# Patient Record
Sex: MALE | Race: WHITE | NOT HISPANIC OR LATINO | URBAN - METROPOLITAN AREA
[De-identification: names, ages, dates, MRNs, and addresses within clinical notes are randomized per-mention and may not be internally consistent; named-entity substitution may affect disease eponyms.]

---
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
## 2021-05-28 NOTE — TELEPHONE ENCOUNTER
Name of form/paperwork: Other:  college vaccine form   Have you been seen for this request: 1/11/19  Do we have the form: yes   When is form needed by: ASAP   How would you like the form returned:  - please call [phone]  Fax Number: NA   Patient Notified form requests are processed in 3-5 business days: Yes  (If patient needs form sooner, please note that in this message.)  Okay to leave a detailed message? Yes    Form prepped and placed on PCP's desk for review and signature.      ISELA Nguyen

## 2021-05-31 VITALS — BODY MASS INDEX: 24.58 KG/M2 | WEIGHT: 175.6 LBS | HEIGHT: 71 IN

## 2021-05-31 VITALS — WEIGHT: 167.8 LBS

## 2021-06-01 VITALS — WEIGHT: 164.1 LBS | BODY MASS INDEX: 22.97 KG/M2 | HEIGHT: 71 IN

## 2021-06-02 VITALS — BODY MASS INDEX: 21.71 KG/M2 | HEIGHT: 72 IN | WEIGHT: 160.3 LBS

## 2021-06-12 NOTE — PROGRESS NOTES
ASSESSMENT:  1. Cough  2 view chest x-rays obtained, which shows no acute infiltrate, pneumothorax, or pulmonary effusion.  NP swab is sent for pertussis.  I recommended starting azithromycin, as below.  We discussed airway reactivity and potential mycoplasma infection.  I also recommended starting albuterol 2 puffs every 4 hours while awake, as below, based on history.  If the inhaler is beneficial, continuing to the cough is gone, and then wean off, otherwise discontinue after several days.  Return for further evaluation if there is no significant improvement over the next several days to a week, sooner with new or worsening symptoms.  Encouraged grandmother to call with concerns or questions.    - XR Chest PA and Lateral; Future  - Bordetella pertussis PCR  - azithromycin (ZITHROMAX) 250 MG tablet; Take 500 mg (2 x 250 mg tablets) on day 1 followed by 250 mg (1 tablet) on days 2-5.  Dispense: 6 tablet; Refill: 0  - albuterol (PROAIR HFA;PROVENTIL HFA;VENTOLIN HFA) 90 mcg/actuation inhaler; Inhale 2 puffs every 4 (four) hours as needed (coughing).  Dispense: 18 g; Refill: 0      PLAN:  Patient Instructions   Start inhaler: 2 puffs every 4 hours while awake. If this does not help, you may discontinue medication after 2-3 days.    Start azithromycin as prescribed, 2 tablets together today and 1 tablet daily for the next 4 days. Take this medication around the same time each day.       Orders Placed This Encounter   Procedures     Bordetella pertussis PCR     Order Specific Question:   Nasopharyngeal Source:     Answer:   Nasopharyngeal swab     XR Chest PA and Lateral     Standing Status:   Future     Number of Occurrences:   1     Standing Expiration Date:   9/6/2018     Order Specific Question:   Can the procedure be changed per Radiologist protocol?     Answer:   Yes     Medications Discontinued During This Encounter   Medication Reason     atovaquone-proguanil (MALARONE) 250-100 mg Tab        No Follow-up on  file.    CHIEF COMPLAINT:  Chief Complaint   Patient presents with     Cough     x1 month very congested hard to play sports worse with exercise        HISTORY OF PRESENT ILLNESS:  Scot is a 17 y.o. male presenting to the clinic today with grandma, Clair, with concerns for cough. Symptoms started 1 month ago while he was at a cabin in [place], [place], with friends. He used to cough every few minutes and the cough is now improved except when he is playing sports. His cough is described as productive and the mucus often gets caught in his throat when he is running around. The mucus is green. He has had some difficulty breathing after coughing stretches at the initial onset of cough, this is improved now. He denies post-tussive emesis and wheezing.       REVIEW OF SYSTEMS:   He had one headache in his forehead region a couple days ago. He otherwise denies pressure in sinuses. He had brief diarrhea after travel to [facility]. All other systems are negative.    PFSH:  He just returned from [place], [place]. He is playing both basketball and lacrosse. As reviewed above.     TOBACCO USE:  History   Smoking Status     Never Smoker   Smokeless Tobacco     Never Used     Comment: No exposure to secondhand smoke.       VITALS:  Vitals:    09/06/17 1400   Pulse: 53   Temp: 97.3  F (36.3  C)   TempSrc: Oral   SpO2: 97%   Weight: 167 lb 12.8 oz (76.1 kg)     Wt Readings from Last 3 Encounters:   09/06/17 167 lb 12.8 oz (76.1 kg) (84 %, Z= 1.00)*   12/02/16 152 lb 12.8 oz (69.3 kg) (77 %, Z= 0.74)*   01/05/16 150 lb 9.6 oz (68.3 kg) (84 %, Z= 1.00)*     * Growth percentiles are based on CDC 2-20 Years data.     There is no height or weight on file to calculate BMI.    PHYSICAL EXAM:  Alert, no acute distress.   HEENT, Conjunctivae are clear, TMs are without erythema, pus or fluid. Position and landmarks are normal. No maxillary or frontal sinus tenderness. Nose is clear. Oropharynx is slightly erythematous, without tonsillar  hypertrophy, asymmetry, exudate or lesions.  Neck is supple without adenopathy.  Lungs are clear and have good air entry bilaterally, without wheezes or crackles.  No prolongation of expiratory phase.   No tachypnea, retractions, or increased work of breathing.  Cardiac exam regular rate and rhythm, normal S1 and S2.  Abdomen is soft and nontender, bowel sounds are present, no hepatosplenomegaly.  Skin, clear without rash.  Neuro, moving all extremities equally.      No results found for this or any previous visit (from the past 24 hour(s)).    ADDITIONAL HISTORY SUMMARIZED (2): Reviewed Office Visit from 12/2/2016 regarding travel consult.  DECISION TO OBTAIN EXTRA INFORMATION (1): None.   RADIOLOGY TESTS (1): Chest XR ordered.  LABS (1): Labs ordered.  MEDICINE TESTS (1): None.  INDEPENDENT REVIEW (2 each): Chest XR interpreted as above.     The visit lasted a total of 23 minutes face to face with the patient. Over 50% of the time was spent counseling and educating the patient about cough.    I, Amanda Keating, am scribing for and in the presence of, Dr. Frey.    I, Lenin Frey, personally performed the services described in this documentation, as scribed by Amanda Keating in my presence, and it is both accurate and complete.    MEDICATIONS:  Current Outpatient Prescriptions   Medication Sig Dispense Refill     albuterol (PROAIR HFA;PROVENTIL HFA;VENTOLIN HFA) 90 mcg/actuation inhaler Inhale 2 puffs every 4 (four) hours as needed (coughing). 18 g 0     azithromycin (ZITHROMAX) 250 MG tablet Take 500 mg (2 x 250 mg tablets) on day 1 followed by 250 mg (1 tablet) on days 2-5. 6 tablet 0     No current facility-administered medications for this visit.        Total data points: 6

## 2021-06-14 NOTE — PROGRESS NOTES
[facility] Well Child Check    ASSESSMENT & PLAN  Scot Harden is a 17  y.o. 11  m.o. who has normal growth and normal development.    Diagnoses and all orders for this visit:    Encounter for routine child health examination without abnormal findings  -     Meningococcal MCV4P  -     Influenza, Seasonal,Quad Inj, 36+ MOS (multi-dose vial)  -     Hearing Screening  -     Vision Screening  -     PHQ9 Depression Screen    Sports history form was reviewed, and sports clearance form was provided.    Return to clinic in 1 year for a Well Child Check or sooner as needed    IMMUNIZATIONS/LABS  Immunizations were reviewed and orders were placed as appropriate. and I have discussed the risks and benefits of all of the vaccine components with the patient/parents.  All questions have been answered.    REFERRALS  Dental:  Recommend routine dental care as appropriate., The patient has already established care with a dentist.  Other:  No referrals were made at this time.    ANTICIPATORY GUIDANCE  I have reviewed age appropriate anticipatory guidance.  Social:  Friends, Extracurricular Activities and Changes and Choices  Parenting:  [place]/Dependence, Homework and Confidential Health Care  Nutrition:  Age Specific Nutritional Needs  Play and Communication:  Organized Sports, Hobbies and Read Books  Health:  Self Testicular Exam, Activity (>45 min/day), Sleep and Dental Care  Safety:  Seat Belts and Bike/Motorcycle Helmets  Sexuality:  Safe Sex and STD's    HEALTH HISTORY  Do you have any concerns that you'd like to discuss today?: No concerns     Roomed by: Collette INGRAM CMA    Accompanied by Mother      Do you have any significant health concerns in your family history?: Yes: see below.  Family History   Problem Relation Age of Onset     Celiac disease Mother      Diabetes type II Maternal Grandfather      Diabetes type II Paternal Grandfather      Crohn's disease Maternal Uncle      No Medical Problems Father      No Medical  Problems Brother      No Medical Problems Brother      No Medical Problems Brother      Since your last visit, have there been any major changes in your family, such as a move, job change, separation, divorce, or death in the family?: No  Has a lack of transportation kept you from medical appointments?: No    Home  Who lives in your home?:  See narrative below.   Social History     Social History Narrative    Lives with mom, Ami, dad, and 3 brothers, Neftaly, Mathieu, and Raffi        Mom and Dad are      Do you have any concerns about losing your housing?: No  Is your housing safe and comfortable?: Yes    Do you have any trouble with sleep?:  No, he falls asleep quickly in the evening. He sleeps soundly through the night without waking. He gets sufficient restful sleep each night. He has a good energy level.    Education  What school do you child attend?:  [employer]  What grade are you in?:  11th  How do you perform in school (grades, behavior, attention, homework?: pretty good 3.5 gpa. He is in 11th grade this year. He enjoys school and learning. He focuses well in class, completes his work on time, and earns good grades. He [place] He would like to study civil engineering. He would also like to play collegiate lacrosse.    Eating He has a good appetite. He does not skip meals throughout the day. He eats a healthy, balanced diet with a variety of fruits, vegetables, and proteins. He tries to make healthy food choices. He drinks 2% milk and water daily.  Do you eat regular meals including fruits and vegetables?:  yes  What are you drinking (cow's milk, water, soda, juice, sports drinks, energy drinks, etc)?: cow's milk- 2% and water  Have you been worried that you don't have enough food?: No  Do you have concerns about your body or appearance?:  No    Activities  Do you have friends?:  Yes, he has good friends with whom he gets along  well and enjoys spending time.  Do you get at least one hour of physical activity per day?:  yes  How many hours a day are you in front of a screen other than for schoolwork (computer, TV, phone)?:  3  What do you do for exercise?:  Lacrosse, basketball, working out, and  snowboarding   Do you have interest/participate in community activities/volunteers/school sports?:  yes, lacrosse, volunteer at a [employer], and basketball      MENTAL HEALTH SCREENING  PHQ-2 Total Score: 0 (12/22/2017 12:00 PM)  PHQ-9 Total Score: 0 (12/22/2017 12:00 PM)    VISION/HEARING  Vision: Completed. See Results  Hearing:  Completed. See Results     Hearing Screening    125Hz 250Hz 500Hz 1000Hz 2000Hz 3000Hz 4000Hz 6000Hz 8000Hz   Right ear:   25 20 20  20 20    Left ear:   30 20 20  20 20       Visual Acuity Screening    Right eye Left eye Both eyes   Without correction: 20/20 20/20 20/20   With correction:        TB Risk Assessment:  The patient and/or parent/guardian answer positive to:  self or family member has traveled outside of the [place] in the past 12 months  self or family memeber has been homeless, living in a homeless shelter or been in group home  dad is a      Dyslipidemia Risk Screening  Have either of your parents or any of your grandparents had a stroke or heart attack before age 55?: No  Any parents with high cholesterol or currently taking medications to treat?: No     Dental  When was the last time you saw the dentist?: 3-6 months ago   Is child seen by dentist?     Yes    Patient Active Problem List   Diagnosis     Cough     Drugs  Does the patient use tobacco/alcohol/drugs?:  no    Safety  Does the patient have any safety concerns (peer or home)?:  no  Does the patient use safety belts, helmets and other safety equipment?:  yes    Sex  Have you ever had sex?:  Yes, he and his partner always use condoms for protection. He reports one incidence of condom slippage and/or breakage. He denies any  "STI symptoms including penile discharge, burning with urination, dysuria, or testicular pain. He is confident his partner has not had any previous sexual partners. He performs regular testicular self-exams.    REVIEW OF SYSTEMS  History obtained from mother and child.  General: Negative  Shoulder Injury: He sustained a left shoulder injury a few months ago which was revealed to be an impingement. He was seen by an orthopedist who referred him to physical therapy. He worked on proper weightlifting techniques. His shoulder has nearly fully recovered. However he continues to experience mild pain if he does not perform a weightlifting routine with proper technique. He has injured his shoulder a few times in the past.  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  Respiration: He had a cough three months ago which was causing wheezing. He had difficulty during physical activity. He used albuterol prior to exercise which helped improve his breathing. He has not needed to use the albuterol inhaler lately.  Dental: He brushes his teeth daily. He does not have dental caries.  His parents have no other health or developmental concerns.    MEASUREMENTS  Height:  5' 11.25\" (1.81 m)  Weight: 175 lb 9.6 oz (79.7 kg)  BMI: Body mass index is 24.32 kg/(m^2).  Blood Pressure: 118/62  Blood pressure percentiles are 40 % systolic and 28 % diastolic based on NHBPEP's 4th Report. Blood pressure percentile targets: 90: 134/84, 95: 138/88, 99 + 5 mmH/101.    PHYSICAL EXAM  Constitutional: He appears well-developed and well-nourished.   HEENT: Head: Normocephalic.    Right Ear: Tympanic membrane, external ear and canal normal.    Left Ear: Tympanic membrane, external ear and canal normal.    Nose: Nose normal.    Mouth/Throat: Mucous membranes are moist. Oropharynx is clear.    Eyes: Conjunctivae and lids are normal. Pupils are equal, round, and reactive to light. Optic discs are sharp. "   Neck: Neck supple. No thyromegaly or adenopathy is present.   Cardiovascular: Normal rate and regular rhythm. No murmur heard.  Pulmonary/Chest: Effort normal and breath sounds normal. There is normal air entry.   Abdominal: Soft. There is no hepatosplenomegaly. No inguinal hernia.   Genitourinary: Testes normal and penis normal. SMR 5.   Musculoskeletal: Normal range of motion. Normal strength and tone. No abnormalities. Spine is straight. Pre-participation screening exam normal with exception of mildly tight hamstrings bilaterally.  Neurological: He is alert. He has normal reflexes. Gait normal.   Psychiatric: He has a normal mood and affect. His speech is normal and behavior is normal.  Skin: Clear. No rashes.     Complete sports physical and questionnaire completed, no restrictions.    ADDITIONAL HISTORY SUMMARIZED (2): None.  DECISION TO OBTAIN EXTRA INFORMATION (1): None.   RADIOLOGY TESTS (1): None.  LABS (1): None.  MEDICINE TESTS (1): None.  INDEPENDENT REVIEW (2 each): None.     The visit lasted a total of 35 minutes face to face with the patient. Over 50% of the time was spent counseling and educating the patient about his overall health and development.    I, Martin Goodman, am scribing for and in the presence of, Dr. Frey.    ILenin, personally performed the services described in this documentation, as scribed by Martin Goodman in my presence, and it is both accurate and complete.    Total Data Points: 0

## 2021-06-16 PROBLEM — L30.9 ECZEMA: Status: ACTIVE | Noted: 2019-01-11

## 2021-06-16 PROBLEM — Z83.79 FAMILY HISTORY OF CELIAC DISEASE: Status: ACTIVE | Noted: 2018-06-07

## 2021-06-16 PROBLEM — K59.00 CONSTIPATION, UNSPECIFIED CONSTIPATION TYPE: Status: ACTIVE | Noted: 2019-01-13

## 2021-06-16 PROBLEM — R10.9 ABDOMINAL PAIN: Status: ACTIVE | Noted: 2018-06-07

## 2021-06-17 NOTE — PATIENT INSTRUCTIONS - HE
Patient Instructions by Ynes Story Scribe at 1/11/2019  8:40 AM     Author: Ynes Story Scribe Service: -- Author Type: Agnes    Filed: 1/11/2019  9:31 AM Encounter Date: 1/11/2019 Status: Addendum    : Lenin Frey MD (Physician)    Related Notes: Original Note by Ynes Story Scribe ([email or web address]) filed at 1/11/2019  9:04 AM       Continue to take Miralax and try to drink two liters of water every day.  Patient Education             [facility] Patient Handout   18 to 21 Year Visits     Your Daily Life    Visit the dentist at least twice a year.    Protect your hearing at work, home, and concerts.    Eat a variety of healthy foods.    Eat breakfast every morning.    Drink plenty of water.    Make sure to get enough calcium.    Have 3 or more servings of low-fat (1%) or fat-free milk and other low-fat dairy products each day.    Aim for 1 hour of vigorous physical activity.    Be proud of yourself when you do something well.  Healthy Behavior Choices    Support friends who choose not to use drugs, alcohol, tobacco, steroids, or diet pills.    If you use drugs or alcohol, you can talk to us about it. We can help you with quitting or cutting down on your use.    Make healthy decisions about your sexual behavior.    If you are sexually active, always practice safe sex. Always use a condom to prevent STIs.    All sexual activity should be something you want. No one should ever force or try to convince you.    Find safe activities at school and in the community. Violence and Injuries    Do not drink and drive or ride in a vehicle with someone who has been using drugs or alcohol.    If you feel unsafe driving or riding with someone, call someone you trust to drive you.    Always wear a seat belt in the car.    Know the rules for safe driving.    Never allow physical harm of yourself or others at home or school.    Always deal with conflict using nonviolence.    Remember that healthy  dating relationships are built on respect and that saying no is OK.    Fighting and carrying weapons can be dangerous.  Your Feelings    Figure out healthy ways to deal with stress.    Try your best to solve problems and make decisions on your own.    Most people have daily ups and downs. But if you are feeling sad, depressed, nervous, irritable, hopeless, or angry, talk with me or another health professional.    We understand sexuality is an important part of your development. If you have any questions or concerns, we are here for you. School and Friends    Take responsibility for being organized enough to succeed in work or school.    Find new activities you enjoy.    Consider volunteering and helping others in the community on an issue that interests or concerns you.    Form healthy friendships and find fun, safe things to do with friends.    As you get older, making and keeping friends is important. You may find that you drift away from some of your old friends--thats normal.    Evaluate your friendships and keep those that are healthy.    It is still important to stay connected with your family.

## 2021-06-18 NOTE — PROGRESS NOTES
ASSESSMENT:  1. Abdominal pain  Abdominal radiograph shows stool throughout a non-dilated colon.  I suggested a trial of MiraLax 17 gm daily and increasing daily water intake to 1.5-2 L, returning for further evaluation if no improvement    - XR Abdomen AP; Future    2. Family history of celiac disease  Discussed screening for celiac if abdominal pain or constipation persist.    3. Travel advice encounter  Discussed food, water, and sun safety.  Scot is fully vaccinated.      PLAN:  There are no Patient Instructions on file for this visit.    Orders Placed This Encounter   Procedures     XR Abdomen AP     Standing Status:   Future     Number of Occurrences:   1     Standing Expiration Date:   6/5/2019     Order Specific Question:   Can the procedure be changed per Radiologist protocol?     Answer:   Yes     Medications Discontinued During This Encounter   Medication Reason     albuterol (PROAIR HFA;PROVENTIL HFA;VENTOLIN HFA) 90 mcg/actuation inhaler Therapy completed       No Follow-up on file.    CHIEF COMPLAINT:  Chief Complaint   Patient presents with     Abdominal Pain     during school only, during first period does not happen on weekends      Travel Consult     6[facility]        HISTORY OF PRESENT ILLNESS:  Scot is a 18 y.o. male presenting to the clinic today with mom with concerns for abdominal pain with upcoming travel. Symptoms started just over one month ago. He has abdominal pain and nausea in the mornings at school, usually during first hour. He sometimes has to leave to go home. He eats a similar breakfast and lunch everyday to stay consistent. He eats oatmeal and toast for breakfast and sandwich with chips for lunch. He notes that if he eats too much or too fast the pain is worse. His pain is typically alleviated by drinking water. He rarely pain and nausea on the weekends. He has a 1-2 bowel movements per day that are not difficult to pass, Ernul type 2 or 3. Mom notes that he takes a long time  "in the bathroom, he estimates 5 minutes. He denies blood or mucus in stool. He eats gluten without increased pain. He drinks milk with every meal in addition to yogurt and cheese, no associated pain. He is traveling to [facility], from 6/9-6/30/2018 with his [employer] class; he does not need any forms or immunizations for this trip. He ended school with a 3.68 GPA and is pleased with this.     REVIEW OF SYSTEMS:   He had a headache last night that he attributes to dehydration. He drinks 2-3 water bottles per day. He denies heartburn or wet burps. He used inhaler during lacrosse tryouts with improvement, not since. All other systems are negative.    PFSH:  Mom has history of Celiac disease. He is playing lacrosse. He is UTD with vaccinations.    TOBACCO USE:  History   Smoking Status     Never Smoker   Smokeless Tobacco     Never Used     Comment: No exposure to secondhand smoke.       VITALS:  Vitals:    06/05/18 1551   BP: 102/64   Patient Site: Left Arm   Patient Position: Sitting   Cuff Size: Adult Regular   Weight: 164 lb 1.6 oz (74.4 kg)   Height: 5' 10.5\" (1.791 m)     Wt Readings from Last 3 Encounters:   06/05/18 164 lb 1.6 oz (74.4 kg) (76 %, Z= 0.71)*   12/22/17 175 lb 9.6 oz (79.7 kg) (88 %, Z= 1.16)*   09/06/17 167 lb 12.8 oz (76.1 kg) (84 %, Z= 1.00)*     * Growth percentiles are based on CDC 2-20 Years data.     Body mass index is 23.21 kg/(m^2).    PHYSICAL EXAM:  Alert, no acute distress.   HEENT, Conjunctivae are clear and anicteric. Oropharynx is moist and clear, without lesions. Lips were a little dry.  Neck is supple without adenopathy or thyromegaly.  Lungs are clear and have good air entry bilaterally, without wheezes or crackles.   Cardiac exam regular rate and rhythm, normal S1 and S2.  Abdomen is soft and non-distended, Mildly tender diffusely without peritoneal signs. Bowel sounds are present, no hepatosplenomegaly or mass palpable.  Skin, clear without rash.  Neuro, moving all extremities " equally.    ADDITIONAL HISTORY SUMMARIZED (2): None.  DECISION TO OBTAIN EXTRA INFORMATION (1): None.   RADIOLOGY TESTS (1): Abdominal XR ordered today.  LABS (1): None.  MEDICINE TESTS (1): None.  INDEPENDENT REVIEW (2 each): Abdominal XR interpreted as above.     The visit lasted a total of 26 minutes face to face with the patient. Over 50% of the time was spent counseling and educating the patient about travel consult.    IAmanda, am scribing for and in the presence of, Dr. Frey.    I, Lenin Frey, personally performed the services described in this documentation, as scribed by Amanda Keating in my presence, and it is both accurate and complete.    MEDICATIONS:  No current outpatient prescriptions on file.     No current facility-administered medications for this visit.        Total data points: 3

## 2021-06-23 NOTE — PROGRESS NOTES
[facility] Well Child Check    ASSESSMENT & PLAN  Scot Harden is a 19 y.o. who has normal growth and normal development.    Diagnoses and all orders for this visit:    Encounter for annual health examination  -     Hearing Screening  -     Vision Screening    Periumbilical abdominal pain  Constipation, unspecified constipation type  -     Celiac(Gluten)Antibody Panel    In light of his persisting abdominal pain and constipation, despite (albeit intermittent) MiraLax use, I recommend testing for celiac today.  I suggested resuming MiraLax and increasing daily water intake.  We discussed indications for consultation with gastroenterology.  Return to clinic if symptoms persist for further evaluation.    Eczema, unspecified type  -     triamcinolone (KENALOG) 0.025 % ointment  Dispense: 30 g; Refill: 1  Reviewed home eczema treatment and Rx given for triamcinolone, since Scot never filled the dermatologist's Rx.  We discussed potential role his compression shorts are playing in his dermatitis and suggested avoiding them for now.      Return to clinic in 1 year for a Well Child Check or sooner as needed    IMMUNIZATIONS/LABS  No immunizations due today.    REFERRALS  Dental:  The patient has already established care with a dentist.  Other:  No referrals were made at this time.    ANTICIPATORY GUIDANCE  I have reviewed age appropriate anticipatory guidance.  Social:  Friends, Peer Pressure and Extracurricular Activities  Parenting:  Family Time and Confidential Health Care  Nutrition:  Junk Food and Dieting  Play and Communication:  Organized Sports and Hobbies  Health:  Drugs, Smoking, Alcohol, Self Testicular Exam, Activity (>45 min/day), Sleep and Dental Care  Safety:  Seat Belts and Contact Sports  Sexuality:  Safe Sex    HEALTH HISTORY  Do you have any concerns that you'd like to discuss today?: testing for stomach issues    Patient was evaluated for abdominal pain in clinic seven months ago for which he was  "advised to take Miralax after his abdominal x-ray showed constipation. He explains the Miralax provided minimal relief and that he continues to experience abdominal pain. He reports his abdominal pain typically occurs while he is in class; he adds he will have a \"hot flash\" which is followed by abdominal pain always located in his periumbilical region. His maternal uncle has a history of Crohn's disease and his mother has a history of IBS. He notes the frequency in which he has episodes of abdominal pain varies; he will have an episode on multiple days and then will not have another episode for a week or two. He estimates he has diarrhea once a month and states his stools are typically type 3 or 4. He denies any blood or mucous in his stool. He has not attempted to remove gluten from his diet. He has lost some weight since his last visit but notes he has cut out certain foods from his diet that he believes trigger his pain. He denies any pain with bowel movements. He reports his abdominal pain is not alleviated when he has a bowel movement. He is involved in basketball and lacrosse but has not noticed any unusual fatigue.    Rash: He developed a rash for which he was seen by a dermatologist and diagnosed with eczema. The rash is located on both inner thighs and his right arm. He notes he gets this every winter. He adds that the rash has improved since he saw the dermatologist. He reports he typically wears compression shorts when he is involved with physical activity which be believes is the cause of his eczema on the inner thighs.    Outside of the room, mom denies any concerns for depression or substance abuse.    Roomed by: Collette HUNT     Accompanied by Mother        Do you have any significant health concerns in your family history?: No  Family History   Problem Relation Age of Onset     Celiac disease Mother      Diabetes type II Maternal Grandfather      Diabetes type II Paternal Grandfather      Crohn's " disease Maternal Uncle      No Medical Problems Father      No Medical Problems Brother      No Medical Problems Brother      Since your last visit, have there been any major changes in your family, such as a move, job change, separation, divorce, or death in the family?: No  Has a lack of transportation kept you from medical appointments?: No    Home  Who lives in your home?:  Mom dad and 2 brothers; history below shows Raffi as a brother but he only has two brothers: Neftaly and Mathieu  Social History     Social History Narrative    Lives with mom, Ami, dad, and 3 brothers, Neftaly, Mathieu, and Raffi        Mom and Dad are      Do you have any concerns about losing your housing?: No  Is your housing safe and comfortable?: Yes  Do you have any trouble with sleep?:  No    Education  What school do you child attend?:  [employer]  What grade are you in?:  12th  How do you perform in school (grades, behavior, attention, homework?: Doing well      Eating  Do you eat regular meals including fruits and vegetables?:  yes  What are you drinking (cow's milk, water, soda, juice, sports drinks, energy drinks, etc)?: cow's milk- 2% and water  Have you been worried that you don't have enough food?: No  Do you have concerns about your body or appearance?:  No    Activities  Do you have friends?:  yes  Do you get at least one hour of physical activity per day?:  yes  How many hours a day are you in front of a screen other than for schoolwork (computer, TV, phone)?:  4  What do you do for exercise?:  Lacrosse, basketball and work outs    Do you have interest/participate in community activities/volunteers/school sports?:  yes, lacrosse, basketball and volunteers at the [employer]     MENTAL HEALTH SCREENING  PHQ-2 Total Score: 0 (1/11/2019 10:00 AM)    PHQ-9 Total Score: 2 (1/11/2019 10:00 AM)      VISION/HEARING  Vision: Completed. See Results  Hearing:  Completed. See Results     Hearing Screening    125Hz 250Hz  "500Hz 1000Hz 2000Hz 3000Hz 4000Hz 6000Hz 8000Hz   Right ear:   25 20 20  20 20    Left ear:   30 20 20  20 20       Visual Acuity Screening    Right eye Left eye Both eyes   Without correction: 20/16 20/16 20/16   With correction:      Comments: Plus Lens: Pass: blurring of vision with +2.50 lens glasses      TB Risk Assessment:  The patient and/or parent/guardian answer positive to:  self or family member has traveled outside of the [place] in the past 12 months  self or family memeber has been homeless, living in a homeless shelter or been in alf  he traveled to [facility] and dad is a      Dyslipidemia Risk Screening  Have either of your parents or any of your grandparents had a stroke or heart attack before age 55?: No  Any parents with high cholesterol or currently taking medications to treat?: No     Dental  When was the last time you saw the dentist?: 3-6 months ago     Patient Active Problem List   Diagnosis     Abdominal pain     Family history of celiac disease     Eczema     Constipation, unspecified constipation type       Drugs  Does the patient use tobacco/alcohol/drugs?:  Yes, he occasionally drinks alcohol on the weekends but states he does not drink the point of intoxication because the alcohol affects his stomach.    Safety  Does the patient have any safety concerns (peer or home)?: No  Does the patient use safety belts, helmets and other safety equipment?: Yes    Sex  Have you ever had sex?:  Yes; states he always uses protection. Denies SGA.    MEASUREMENTS  Height:  5' 11.5\" (1.816 m)  Weight: 160 lb 4.8 oz (72.7 kg)  BMI: Body mass index is 22.05 kg/m .  Blood Pressure: (!) 88/60  Blood pressure percentiles are <1 % systolic and 14 % diastolic based on the 2017 AAP Clinical Practice Guideline. Blood pressure percentile targets: 90: 134/83, 95: 139/86, 95 + 12 mmH/98.    PHYSICAL EXAM  Constitutional: He appears well-developed and well-nourished. No acute distress. Mood " and affect are neutral.   HEENT: Head: Normocephalic.    Right Ear: Tympanic membrane, external ear and canal normal.    Left Ear: Tympanic membrane, external ear and canal normal.    Nose: Nose normal.    Mouth/Throat: Mucous membranes are moist. Oropharynx is clear.    Eyes: Conjunctivae and lids are normal. Pupils are equal, round, and reactive to light. Optic discs are sharp.   Neck: Neck supple without adenopathy or thyromegaly.   Cardiovascular: Normal rate and regular rhythm. No murmur heard.  Pulmonary/Chest: Effort normal and breath sounds normal. There is normal air entry.   Abdominal: Soft. There is no hepatosplenomegaly. No inguinal hernia.   Genitourinary: Testes normal and penis normal. SMR: . Eczematous dermatitis on proximal anterior thighs bilaterally.  Musculoskeletal: Normal range of motion. Normal strength and tone. No abnormalities. Spine is straight. Screening PPE normal.  Neurological: He is alert. He has normal reflexes. Gait normal.   Psychiatric: He has a normal mood and affect. His speech is normal and behavior is normal.  Skin: Clear. No rashes.     Total time was 34 minutes, greater than 50% counseling and coordinating care regarding the above issues.    ADDITIONAL HISTORY SUMMARIZED (2): None.   DECISION TO OBTAIN EXTRA INFORMATION (1): None.   RADIOLOGY TESTS (1): None.  LABS (1): None.  MEDICINE TESTS (1): None.  INDEPENDENT REVIEW (2 each): None.     Total data points = 0    By signing my name below, IYnes, attest that this documentation has been prepared under the direction and in the presence of Dr. Lenin Frey.  Electronic Signature: Agnes Tijerina. 2019 09:00.    I, Dr. Lenin Frey , personally performed the services described in this documentation. All medical record entries made by the scribe were at my direction and in my presence. I have reviewed the chart and discharge instructions (if applicable) and agree that the record reflects my  personal performance and is accurate and complete.

## 2021-06-24 NOTE — TELEPHONE ENCOUNTER
Patient's mother notified of response from provider.  Velma Baker CMA [facility]

## 2021-06-27 ENCOUNTER — HEALTH MAINTENANCE LETTER (OUTPATIENT)
Age: 21
End: 2021-06-27

## 2021-10-17 ENCOUNTER — HEALTH MAINTENANCE LETTER (OUTPATIENT)
Age: 21
End: 2021-10-17

## 2021-11-12 ENCOUNTER — TRANSFERRED RECORDS (OUTPATIENT)
Dept: HEALTH INFORMATION MANAGEMENT | Facility: CLINIC | Age: 21
End: 2021-11-12

## 2022-07-23 ENCOUNTER — HEALTH MAINTENANCE LETTER (OUTPATIENT)
Age: 22
End: 2022-07-23

## 2022-10-01 ENCOUNTER — HEALTH MAINTENANCE LETTER (OUTPATIENT)
Age: 22
End: 2022-10-01

## 2023-08-06 ENCOUNTER — HEALTH MAINTENANCE LETTER (OUTPATIENT)
Age: 23
End: 2023-08-06

## 2023-10-02 ENCOUNTER — TRANSFERRED RECORDS (OUTPATIENT)
Dept: HEALTH INFORMATION MANAGEMENT | Facility: CLINIC | Age: 23
End: 2023-10-02

## 2025-07-09 ENCOUNTER — APPOINTMENT (OUTPATIENT)
Dept: URBAN - METROPOLITAN AREA CLINIC 260 | Age: 25
Setting detail: DERMATOLOGY
End: 2025-07-09

## 2025-07-09 VITALS — HEIGHT: 72 IN | WEIGHT: 190 LBS

## 2025-07-09 DIAGNOSIS — D22 MELANOCYTIC NEVI: ICD-10-CM

## 2025-07-09 DIAGNOSIS — D49.2 NEOPLASM OF UNSPECIFIED BEHAVIOR OF BONE, SOFT TISSUE, AND SKIN: ICD-10-CM

## 2025-07-09 DIAGNOSIS — D18.0 HEMANGIOMA: ICD-10-CM

## 2025-07-09 DIAGNOSIS — Z71.89 OTHER SPECIFIED COUNSELING: ICD-10-CM

## 2025-07-09 DIAGNOSIS — L81.4 OTHER MELANIN HYPERPIGMENTATION: ICD-10-CM

## 2025-07-09 PROBLEM — D22.5 MELANOCYTIC NEVI OF TRUNK: Status: ACTIVE | Noted: 2025-07-09

## 2025-07-09 PROBLEM — D18.01 HEMANGIOMA OF SKIN AND SUBCUTANEOUS TISSUE: Status: ACTIVE | Noted: 2025-07-09

## 2025-07-09 PROCEDURE — OTHER MIPS QUALITY: OTHER

## 2025-07-09 PROCEDURE — OTHER PHOTO-DOCUMENTATION: OTHER

## 2025-07-09 PROCEDURE — OTHER BIOPSY BY SHAVE METHOD: OTHER

## 2025-07-09 PROCEDURE — OTHER COUNSELING: OTHER

## 2025-07-09 ASSESSMENT — LOCATION DETAILED DESCRIPTION DERM
LOCATION DETAILED: RIGHT LATERAL PROXIMAL PRETIBIAL REGION
LOCATION DETAILED: PERIUMBILICAL SKIN
LOCATION DETAILED: INFERIOR THORACIC SPINE
LOCATION DETAILED: STERNUM
LOCATION DETAILED: SUPERIOR LUMBAR SPINE

## 2025-07-09 ASSESSMENT — LOCATION SIMPLE DESCRIPTION DERM
LOCATION SIMPLE: CHEST
LOCATION SIMPLE: UPPER BACK
LOCATION SIMPLE: LOWER BACK
LOCATION SIMPLE: RIGHT PRETIBIAL REGION
LOCATION SIMPLE: ABDOMEN

## 2025-07-09 ASSESSMENT — LOCATION ZONE DERM
LOCATION ZONE: TRUNK
LOCATION ZONE: LEG